# Patient Record
Sex: MALE | Race: WHITE
[De-identification: names, ages, dates, MRNs, and addresses within clinical notes are randomized per-mention and may not be internally consistent; named-entity substitution may affect disease eponyms.]

---

## 2018-02-24 ENCOUNTER — HOSPITAL ENCOUNTER (EMERGENCY)
Dept: HOSPITAL 80 - FED | Age: 23
Discharge: HOME | End: 2018-02-24
Payer: MEDICAID

## 2018-02-24 VITALS — SYSTOLIC BLOOD PRESSURE: 123 MMHG | HEART RATE: 74 BPM | DIASTOLIC BLOOD PRESSURE: 67 MMHG

## 2018-02-24 VITALS — TEMPERATURE: 98.4 F | OXYGEN SATURATION: 95 % | RESPIRATION RATE: 16 BRPM

## 2018-02-24 DIAGNOSIS — S93.402A: Primary | ICD-10-CM
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
## 2018-02-24 PROCEDURE — L4386 NON-PNEUM WALK BOOT PRE CST: HCPCS

## 2018-02-24 PROCEDURE — L4350 ANKLE CONTROL ORTHO PRE OTS: HCPCS

## 2018-02-24 NOTE — EDPHY
H & P


Time Seen by Provider: 02/24/18 12:40


HPI/ROS: 





CHIEF COMPLAINT:  Left foot injury





HISTORY OF PRESENT ILLNESS:  22-year-old male presents to the emergency 

department with left foot injury.  The patient was playing soccer on 2/21/2018 

and thinks that he rolled his left ankle.  He complains of pain to the lateral 

aspect of his left foot and left ankle.  He is able to bear weight however he 

denies any other trauma or injury.  He states that he has had numerous ankle 

sprains from playing soccer.





ROS:  Denies numbness or tingling in his toes, pain in his left knee or hip.


Past Medical/Surgical History: 





Attention deficit hyperactivity disorder, seizure, anxiety


Social History: 





Single and lives in [place].  He works in [employer].


Smoking Status: Never smoked


Physical Exam: 





On examination there is no obvious effusion or swelling noted to the left 

ankle.  He does have reproducible pain with palpation to distal part of the 

lateral malleolus.  No abrasion or puncture wound.  No ecchymosis.  He has full 

dorsi and plantar flexion.  No obvious ligament instability.  Strong dorsalis 

pedis pulse on the dorsal aspect of the left foot.  Normal sensation to light 

touch with normal 2 point discrimination.  His Achilles tendon is intact.  His 

calf is nontender.  His knee is stable.  He is able to bear weight however he 

does have an antalgic gait.


Constitutional: 


 Initial Vital Signs











Temperature (C)  36.9 C   02/24/18 12:20


 


Heart Rate  78   02/24/18 12:20


 


Respiratory Rate  16   02/24/18 12:20


 


Blood Pressure  131/75 H  02/24/18 12:20


 


O2 Sat (%)  95   02/24/18 12:20








 











O2 Delivery Mode               Room Air














Allergies/Adverse Reactions: 


 





No Known Allergies Allergy (Unverified 02/24/18 12:20)


 








Home Medications: 














 Medication  Instructions  Recorded


 


Gabapentin [Neurontin 400 MG (*)]  02/24/18


 


Lisdexamfetamine Dimesylate 60 mg PO DAILY 02/24/18





[Vyvanse]  


 


Topiramate [Topamax] 25 mg PO 02/24/18














MDM/Departure





- MDM


Imaging: I viewed and interpreted images myself


Procedures: 





Velcro ankle stirrup splint applied to the left ankle and examined post 

application in good placement with normal CNS.


ED Course/Re-evaluation: 





22-year-old male presents to the emergency department with pain in his left 

ankle.  He was playing soccer and somehow inverted his left foot and ankle.  

Initially x-rays were ordered by the nurse of the left foot which revealed 

possible bony fragment off the lateral malleolus of the ankle.





X-rays were ordered of the left ankle which revealed bony fragment just distal 

to the lateral malleolus which is likely old.  He was well corticated.  Patient 

states that he has had numerous ankle sprains in the past.  No known fractures 

that he is aware of.  Patient has no obvious joint effusion.  There is really 

no swelling noted on examination to the ankle.  He was placed in Velcro ankle 

stirrup splint and given orthopedic referral.  He may weightbear as tolerated 

or use crutches needed.





- Depart


Disposition: Home, Routine, Self-Care


Clinical Impression: 


Left ankle sprain


Qualifiers:


 Encounter type: initial encounter Involved ligament of ankle: unspecified 

ligament Qualified Code(s): S93.402A - Sprain of unspecified ligament of left 

ankle, initial encounter





Condition: Good


Instructions:  Ankle Sprain (ED)


Additional Instructions: 


Sven dasilva for comfort and support.  Ibuprofen 600 mg every 8 hr as needed 

for pain. 





Weightbear as tolerated or use crutches.


Stand Alone Forms:  Work Excuse


Referrals: 


Salvador Villegas MD [Medical Doctor] - 2-3 days without fail (Orthopedic 

surgeon on-call)

## 2018-05-05 ENCOUNTER — HOSPITAL ENCOUNTER (EMERGENCY)
Dept: HOSPITAL 80 - FED | Age: 23
Discharge: HOME | End: 2018-05-05
Payer: MEDICAID

## 2018-05-05 VITALS — SYSTOLIC BLOOD PRESSURE: 127 MMHG | DIASTOLIC BLOOD PRESSURE: 79 MMHG
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
## 2018-05-05 DIAGNOSIS — S00.83XA: Primary | ICD-10-CM
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
## 2019-03-04 ENCOUNTER — HOSPITAL ENCOUNTER (INPATIENT)
Dept: HOSPITAL 80 - FED | Age: 24
LOS: 5 days | Discharge: PSYCHIATRIC OR SUBSTANCE USE FACILITY | DRG: 917 | End: 2019-03-09
Attending: FAMILY MEDICINE | Admitting: FAMILY MEDICINE
Payer: MEDICAID

## 2019-03-04 DIAGNOSIS — S14.3XXA: ICD-10-CM

## 2019-03-04 DIAGNOSIS — F90.2: ICD-10-CM

## 2019-03-04 DIAGNOSIS — R45.851: ICD-10-CM
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
## 2019-03-04 DIAGNOSIS — T43.621A: ICD-10-CM

## 2019-03-04 DIAGNOSIS — R09.02: ICD-10-CM

## 2019-03-04 DIAGNOSIS — E87.5: ICD-10-CM

## 2019-03-04 DIAGNOSIS — G92: ICD-10-CM

## 2019-03-04 DIAGNOSIS — G40.909: ICD-10-CM

## 2019-03-04 DIAGNOSIS — F43.29: ICD-10-CM

## 2019-03-04 DIAGNOSIS — M62.82: ICD-10-CM

## 2019-03-04 DIAGNOSIS — T40.1X1A: Primary | ICD-10-CM
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
## 2019-03-04 DIAGNOSIS — E87.1: ICD-10-CM

## 2019-03-04 DIAGNOSIS — N17.9: ICD-10-CM

## 2019-03-04 DIAGNOSIS — T40.2X1A: ICD-10-CM
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
## 2019-03-04 DIAGNOSIS — F41.8: ICD-10-CM

## 2019-03-04 DIAGNOSIS — R74.0: ICD-10-CM

## 2019-03-04 DIAGNOSIS — E86.0: ICD-10-CM

## 2019-03-04 DIAGNOSIS — I67.83: ICD-10-CM

## 2019-03-04 DIAGNOSIS — I63.543: ICD-10-CM

## 2019-03-04 LAB
CK SERPL-CCNC: (no result) IU/L (ref 0–224)
INR PPP: 1.23 (ref 0.83–1.16)
PLATELET # BLD: 203 10^3/UL (ref 150–400)
PROTHROMBIN TIME: 15 SEC (ref 12–15)

## 2019-03-04 PROCEDURE — G0480 DRUG TEST DEF 1-7 CLASSES: HCPCS

## 2019-03-04 RX ADMIN — SODIUM CHLORIDE ONE: 900 INJECTION, SOLUTION INTRAVENOUS at 23:52

## 2019-03-04 RX ADMIN — SODIUM CHLORIDE ONE MLS: 900 INJECTION, SOLUTION INTRAVENOUS at 22:00

## 2019-03-04 RX ADMIN — NALOXONE HYDROCHLORIDE ONE MG: 0.4 INJECTION, SOLUTION INTRAMUSCULAR; INTRAVENOUS; SUBCUTANEOUS at 20:53

## 2019-03-04 RX ADMIN — NALOXONE HYDROCHLORIDE ONE: 0.4 INJECTION, SOLUTION INTRAMUSCULAR; INTRAVENOUS; SUBCUTANEOUS at 23:51

## 2019-03-04 RX ADMIN — SODIUM CHLORIDE ONE MLS: 900 INJECTION, SOLUTION INTRAVENOUS at 23:00

## 2019-03-04 NOTE — CPEKG
Test Reason : OPEN

Blood Pressure : ***/*** mmHG

Vent. Rate : 105 BPM     Atrial Rate : 105 BPM

   P-R Int : 135 ms          QRS Dur : 101 ms

    QT Int : 339 ms       P-R-T Axes : 079 -33 048 degrees

   QTc Int : 449 ms

 

Sinus tachycardia

Left axis deviation

 

Confirmed by Courtney Troy ([id number]) on 3/4/2019 11:57:13 PM

 

Referred By: Courtney Troy           Confirmed By:Courtney Troy
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
## 2019-03-05 LAB
CK SERPL-CCNC: (no result) IU/L (ref 0–224)
CK SERPL-CCNC: (no result) IU/L (ref 0–224)
INR PPP: 1.35 (ref 0.83–1.16)
PLATELET # BLD: 142 10^3/UL (ref 150–400)
PROTHROMBIN TIME: 16.1 SEC (ref 12–15)

## 2019-03-05 RX ADMIN — PANTOPRAZOLE SODIUM SCH MG: 40 TABLET, DELAYED RELEASE ORAL at 08:28

## 2019-03-05 RX ADMIN — PANTOPRAZOLE SODIUM SCH MG: 40 TABLET, DELAYED RELEASE ORAL at 22:41

## 2019-03-05 RX ADMIN — SODIUM CHLORIDE SCH MLS: 900 INJECTION, SOLUTION INTRAVENOUS at 22:41

## 2019-03-05 RX ADMIN — ACETAMINOPHEN PRN MG: 325 TABLET ORAL at 12:20

## 2019-03-05 RX ADMIN — ACETAMINOPHEN PRN MG: 325 TABLET ORAL at 19:33

## 2019-03-05 RX ADMIN — GABAPENTIN SCH MG: 300 CAPSULE ORAL at 15:20

## 2019-03-05 RX ADMIN — SODIUM CHLORIDE SCH MLS: 900 INJECTION, SOLUTION INTRAVENOUS at 08:28

## 2019-03-05 RX ADMIN — GABAPENTIN SCH MG: 300 CAPSULE ORAL at 22:41

## 2019-03-05 RX ADMIN — NICOTINE SCH MG: 21 PATCH, EXTENDED RELEASE TOPICAL at 08:27

## 2019-03-05 RX ADMIN — SODIUM CHLORIDE SCH MLS: 900 INJECTION, SOLUTION INTRAVENOUS at 02:09

## 2019-03-05 NOTE — ASMTCMCOM
CM Note

 

CM Note                       

Notes:

Pt is a 24 yo  M who presented wih encepalopathy, acidosis, hypoventilation. Pt reportedly 

overdosed on heroin and xanax and is on M1 hold. Pt's sister completed suicide approximately one 

year ago. 



CM spoke with Leyla from [facility] ([phone]) who requested doc to MD santos notified. [facility] 


to eval once medically stable. 



CM to follow. 



Plan: transfer to inpatient behavioral health once medically stable. 

 

Date Signed:  03/05/2019 02:19 PM

Electronically Signed By:TRACY Garcia

## 2019-03-05 NOTE — PDMN
Medical Necessity


Medical necessity: Pt meets inpt criteria per MD order and MCG M-83, Stroke: 

Ischemic, 2 days. 22 y/o w/hx anxiety/depression, ADHD, seizure disorder, and 

polysubstance abuse presented to ED after being found down/unresponsive for up 

to 11.5 hrs, admitted w/ bilateral cerebellar infarcts- uncertain etiology, 

acute hearing loss, L arm weakness, acute renal failure and potentially ATN in 

setting of severe dehydration and rhabdo w/CK of 30,000+, acute toxic metabolic 

encephalopathy, electrolyte disturbances (hyperkalemia, hyponatremia), 

transaminitis, elevated troponin (likely sec to rhabdo), hypoxia, polysubstance 

abuse w/likely overdose- psych consult when mentation improves, leukocytosis. 

ICU care, renal and neuro consults in AM, stroke protocol, warrants inpt 

admission as pt is acutely ill w/high potential for decompensation, anticipate>

2MN for ongoing eval/management of above.

## 2019-03-05 NOTE — PDCONSULT
Consultant Note: 


Renal Consult Note





- Chief Complaint


Found down





- History of Present Illness


The patient is a 22 y/o M with a knonw h/o ADD, seizure disorder and drug abuse 

who presented to the ED last night after being found unresponsive at home. The 

patient was c/o L arm weakness per EMS. He stated that his sister had recently 

committed suicide and he had been using heroin as well as opiates and 

benzodiazepines. He reportedly also had SI. In the ED, he was found to have an 

PRACHI with hyperkalemia, however specimen was slightly hemolyzed. He was placed 

on fluids in the ICU overnight. This am he states that he is still having 

trouble with his arm, however it feels like it is improving. He denies any 

kidney issues in the past and no recent NSAID use. He isn't sure how long he 

was "down" and does not recall many events in the past 48 hours. His biggest 

complaint is muscle cramping.





- Allergies/Home Medication List


No Known Allergies Allergy


 


Home Medications: 


Gabapentin [Neurontin 400 MG (*)]  


Adderall 10 MG (*)  03/04/19


Ativan  03/04/19 


Klonopin  03/04/19 





- Past Medical History


Anxiety, attention deficit hyperactivity disorder, seizure





- Surgical History


No previous surgeries





- Family History


No renal history in his family that he is aware.





- Social History


Smoking Status: Never smoked


Alcohol Use: Occasionally


Drug Use: Heroin, Marijuana





Review of Systems


10pt was reviewed & negative except for what was stated in HPI & below





Objective:

















Temp Pulse Resp BP Pulse Ox


 


 36.9 C   93   12   128/67 H  100 


 


 03/05/19 05:33  03/05/19 07:00  03/05/19 07:00  03/05/19 07:00  03/05/19 07:00




















O2 (L/minute)                  2











Physical Exam:





General: A+Ox3, no distress


HEENT: EOMI, MMM


Neck: Supple, no thyromegaly


CV: RRR, no murmurs or rubs


Resp: CTA b/l


Abd: Soft, NT, ND


Ext: No edema, pulses intact


Neuro: Only able to move L fingers, other extremities ok, CN II-XII grossly 

intact


Skin: No rashes or lesions


Psych: Calm, cooperative

















WBC  10.90 10^3/uL (3.80-9.50)  H  03/05/19  06:25    


 


RBC  4.27 10^6/uL (4.40-6.38)  L  03/05/19  06:25    


 


Hgb  12.7 g/dL (13.7-17.5)  L  03/05/19  06:25    


 


Hct  36.5 % (40.0-51.0)  L  03/05/19  06:25    


 


MCV  85.5 fL (81.5-99.8)   03/05/19  06:25    


 


MCH  29.7 pg (27.9-34.1)   03/05/19  06:25    


 


MCHC  34.8 g/dL (32.4-36.7)   03/05/19  06:25    


 


RDW  13.1 % (11.5-15.2)   03/05/19  06:25    


 


Plt Count  142 10^3/uL (150-400)  L  03/05/19  06:25    


 


MPV  10.0 fL (8.7-11.7)   03/05/19  06:25    


 


Neut % (Auto)  80.7 % (39.3-74.2)  H  03/05/19  06:25    


 


Lymph % (Auto)  9.4 % (15.0-45.0)  L  03/05/19  06:25    


 


Mono % (Auto)  9.3 % (4.5-13.0)   03/05/19  06:25    


 


Eos % (Auto)  0.1 % (0.6-7.6)  L  03/05/19  06:25    


 


Baso % (Auto)  0.1 % (0.3-1.7)  L  03/05/19  06:25    


 


Nucleat RBC Rel Count  0.0 % (0.0-0.2)   03/05/19  06:25    


 


Absolute Neuts (auto)  8.81 10^3/uL (1.70-6.50)  H  03/05/19  06:25    


 


Absolute Lymphs (auto)  1.02 10^3/uL (1.00-3.00)   03/05/19  06:25    


 


Absolute Monos (auto)  1.01 10^3/uL (0.30-0.80)  H  03/05/19  06:25    


 


Absolute Eos (auto)  0.01 10^3/uL (0.03-0.40)  L  03/05/19  06:25    


 


Absolute Basos (auto)  0.01 10^3/uL (0.02-0.10)  L  03/05/19  06:25    


 


Absolute Nucleated RBC  0.00 10^3/uL (0-0.01)   03/05/19  06:25    


 


Immature Gran %  0.4 % (0.0-1.1)   03/05/19  06:25    


 


Immature Gran #  0.04 10^3/uL (0.00-0.10)   03/05/19  06:25    


 


PT  16.1 SEC (12.0-15.0)  H  03/05/19  06:25    


 


INR  1.35  (0.83-1.16)  H  03/05/19  06:25    


 


APTT  34.0 SEC (23.0-38.0)   03/05/19  06:25    


 


VBG Lactic Acid  1.3 mmol/L (0.7-2.1)   03/04/19  23:10    


 


Sodium  134 mEq/L (135-145)  L  03/05/19  06:25    


 


Potassium  4.6 mEq/L (3.5-5.2)   03/05/19  06:25    


 


Chloride  102 mEq/L ()   03/05/19  06:25    


 


Carbon Dioxide  24 mEq/l (22-31)   03/05/19  06:25    


 


Anion Gap  8 mEq/L (6-14)   03/05/19  06:25    


 


BUN  37 mg/dL (7-23)  H  03/05/19  06:25    


 


Creatinine  1.7 mg/dL (0.7-1.3)  H  03/05/19  06:25    


 


Estimated GFR  50   03/05/19  06:25    


 


Glucose  105 mg/dL ()  H  03/05/19  06:25    


 


Calcium  7.9 mg/dL (8.5-10.4)  L  03/05/19  06:25    


 


Ionized Calcium  1.03 MMOL/L (1.12-1.30)  L  03/05/19  03:30    


 


Phosphorus  3.8 mg/dL (2.5-4.5)   03/05/19  06:25    


 


Magnesium  2.2 mg/dL (1.6-2.3)   03/05/19  03:30    


 


Total Bilirubin  0.4 mg/dL (0.1-1.4)   03/05/19  06:25    


 


Conjugated Bilirubin  0.5 mg/dL (0.0-0.5)   03/04/19  21:00    


 


Unconjugated Bilirubin  0.0 mg/dL (0.0-1.1)   03/04/19  21:00    


 


AST  2968 IU/L (17-59)  H  03/05/19  06:25    


 


ALT  1376 IU/L (21-72)  H  03/05/19  06:25    


 


Alkaline Phosphatase  85 IU/L ()   03/05/19  06:25    


 


Creatine Kinase  > 94978 IU/L (0-224)  H  03/05/19  06:25    


 


CK-MB (CK-2) Fraction  1410.00 ng/mL (0.00-4.55)  H  03/05/19  06:25    


 


CK-MB (CK-2) %  TNP   03/05/19  06:25    


 


Creatine Kinase Interp  TNP   03/05/19  06:25    


 


POC Troponin I  0.27 ng/mL (0.00-0.08)  H  03/04/19  23:38    


 


Total Protein  6.3 g/dL (6.3-8.2)   03/05/19  06:25    


 


Albumin  3.6 g/dL (3.5-5.0)   03/05/19  06:25    


 


Urine Color  HASMUKH   03/04/19  23:24    


 


Urine Appearance  HAZY   03/04/19  23:24    


 


Urine pH  6.0  (5.0-7.5)   03/04/19  23:24    


 


Ur Specific Gravity  1.014  (1.002-1.030)   03/04/19  23:24    


 


Urine Protein  2+  (NEGATIVE)  H  03/04/19  23:24    


 


Urine Ketones  NEGATIVE  (NEGATIVE)   03/04/19  23:24    


 


Urine Blood  3+  (NEGATIVE)  H  03/04/19  23:24    


 


Urine Nitrate  NEGATIVE  (NEGATIVE)   03/04/19  23:24    


 


Urine Bilirubin  NEGATIVE  (NEGATIVE)   03/04/19  23:24    


 


Urine Urobilinogen  NEGATIVE EU (0.2-1.0)   03/04/19  23:24    


 


Ur Leukocyte Esterase  NEGATIVE  (NEGATIVE)   03/04/19  23:24    


 


Urine RBC  1-3 /hpf (0-3)   03/04/19  23:24    


 


Urine WBC  5-10 /hpf (0-3)  H  03/04/19  23:24    


 


Ur Epithelial Cells  TRACE /lpf (NONE-1+)   03/04/19  23:24    


 


Urine Mucus  TRACE /lpf (NONE-1+)   03/04/19  23:24    


 


Ur Random Creatinine  109.7 mg/dL  03/04/19  00:11    


 


Ur Random Sodium  10 mEq/L (30-90)  L  03/04/19  00:11    


 


Urine Glucose  NEGATIVE  (NEGATIVE)   03/04/19  23:24    


 


Salicylates  < 1.0 mg/dL (2.0-20.0)  L  03/04/19  21:00    


 


Urine Opiates Screen  NON-NEGATIVE  (NEGATIVE)  H  03/04/19  23:24    


 


Acetaminophen  < 10 mcg/mL (10-30)  L  03/04/19  21:00    


 


Urine Barbiturates  NEGATIVE  (NEGATIVE)   03/04/19  23:24    


 


Ur Phencyclidine Scrn  NEGATIVE  (NEGATIVE)   03/04/19  23:24    


 


Ur Amphetamine Screen  NON-NEGATIVE  (NEGATIVE)  H  03/04/19  23:24    


 


U Benzodiazepines Scrn  NEGATIVE  (NEGATIVE)   03/04/19  23:24    


 


Urine Cocaine Screen  NEGATIVE  (NEGATIVE)   03/04/19  23:24    


 


U Marijuana (THC) Screen  NON-NEGATIVE  (NEGATIVE)  H  03/04/19  23:24    


 


Ethyl Alcohol  < 10 mg/dL (0-10)   03/04/19  21:00    








Imaging: CT results reviewed.





Assessment/Plan: The patient is a 22 y/o M with a known h/o drug abuse who 

presented after being found down after using heroine and other pharmaceutical 

drugs with PRACHI, hyperkalemia, hyponatremia, and L arm weakness. MRI reveals 

multiple infarcts in the cerebellum. 





PRACHI


-ordered renal US


-Philly low and improving with fluids


-baseline cr unknown, 2.1 on presentation now trending down


-UA shows blood and protein, likely 2/2 to rhabdomyolysis with CK>30k


-continue to monitor UO (interiano removed)


-keep MAP>65, avoid nephrotoxins





Hyperkalemia


-repeat K was 5.7, down to 4.6 this am w/o treatment


-renal diet for now


-monitor on telemetry





Hyponatremia


-likely hypoosmolar hypovolemic per urine studies


-presented at 128mg/dL, now up to 134


-goal for today 134-136mg/dL


-neuro recommending higher serum Na due to MRI findings


-would hold 3% and will lower fluids for now given risk of ODS





SI with heroine abuse and CVA


-tox screen reviewed


-no AGMA, low concern for other toxic ingestions


-M1 hold with bedside sitter


-neurology rec's appreciated 





Will continue to follow, please contact if ?'s. #[phone]





Marquis Klein, DO


[facility]

## 2019-03-05 NOTE — NEUROPROG
Assessment: 


[id number]


  -


Neurology Consult:


-


CC: Dr. Haley consulted neurology for abnormal head CT.  Results placed in EMR 

for her review.


-


HPI: 


3/5/19: Pt with PMHx of seizure disorder, anxiety, and polysubstance abuse.  Pt 

found down for 11.5 hours and dehydrated on 3/4/19.  He reported he had been 

using heroin, percocet, and xanax recently.  Head CT suggesting of abnormality 

in posterior circulation region.  Brain MRI showed bilateral cerebellar 

infarcts.  Teleneurology recommended full dose aspirin.  He was in renal 

failure so could not get a head/neck CTA.  Pt also complained of left arm 

weakness (possibly from lying on his arm for prolonged period of time).  Pt in 

renal failure with rhabdomyolysis likely from prolonged dehydration.  I 

initially saw the patient on 3/5/19.  Neurologic exam showed left arm weakness.

  I felt he likely had a PRES from acute renal failure that resulted in 

bilateral cerebellar strokes as well as left arm neuropathy from prolonged 

laying on this arm.  Recommend carotid U/S, TTE, telemetry, and PT/OT/SPeech as 

well as addressing renal issues.


-


PMHx: anxiety, ADD, seizure disorder, polysubstance abuse


-


Home Meds: gabapentin, adderall, ativan, klonopin


-


SHx: no tobacco


FHx: sister committed suicide


-


ROS: Pt denied acute fever, total vision loss, active severe chest pain, 

respiratory failure, total body severe rash, total bowel/bladder incontinence, 

psychosis, active seizures, or active bleeding


-


O:


VS reviewed


General: Alert


Eyes: Fundoscopic exam not able to visualize optic disks


CV: Heart RRR, no murmur, no carotid bruit


Lungs: Clear to auscultation bilaterally, no rhonchi or rales


Neuro:


- Mental: 


.     Oriented x person/place/date


.     concentration appears normal


.     speech fluency/comprehension normal


.     memory appears normal


.     fund of knowledge appear intact


- Cranial Nerves:


.     II: PERRL, VFFTC


.     III/IV/VI: EOMI, no nystagmus, normal smooth pursuits, no Ptosis


.     V: facial sensation intact to LT


.     VII: face symmetric to eye closure and smile


.     VIII: hearing intact to conversation


.     IX/X: uvula raises symmetrically


.     XI: SCM 5/5 B/L strength


.     XII: tongue protrudes midline w/nl strength


- Motor: 


.     Tone: normal tone in all 4 extremity


.     Strength: left arm weakness


- Reflexes: B/L patella 2/4


- Sensory: all 4 extremity intact to light touch


- Coord: no coordination in right arm


- Gait: deferred


-


Labs:


3/4/19- CBC WBC 15.18H, INR 1.23, CK > 30,000


3/5/19- Chem Na 131L BUN 39H Cr 1.7H Gluc 110H Ca 7.7L Ca 1.03L


-


Rads:


3/4/19- Head CT wo: Diffuse low-density and mild swelling of both cerebellar 

hemispheres. While this could represent artifact, posterior circulation 

ischemia or PRES syndrome could appear similar. Recommend MRI of the brain for 

further evaluation (I personally visualized the images on 3/5/19)


-


3/4/19- Cervical MRI wo: Negative MRI examination of the cervical spine


3/4/19- Brain MRI wo con: Multiple bilateral punctate areas of ischemic injury 

involving the cerebellum. No additional areas of ischemia within the cerebral 

hemispheres bilaterally


-


Assessment:


1. 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 

renal failure may have caused PRES.


-


2. Substance Abuse


3. Acute renal failure with rhabdomyolysis


4. Left arm weakness: Possible from cerebellar stroke or prolonged laying on 

the arm.  PT/OT to assess rehab needs.


-


Plan:


- Recommend ICU stay until 3/7/19 to ensure no swelling in posterior fossa 

causing hydrocephalus given location of cerebellar strokes


- Carotid U/S


- TTE


- Telemetry for any afib


- Blood pressure goal < 140/90 from neurology perspective unless nephrology 

feels different goals needed, no permissive HTN as it seems like elevated blood 

pressure from PRES could be cause of strokes


- LDL goal < 70, check lipid panel


- H1AC goal < 7.0, check H1AC


- Continue aspirin 325 mg qd for stroke prevention (not on it prior to 

hospitalization)


- Avoid illegal drugs


- PT/OT/Speech to determine rehab needs


- F/U in neurology clinic 1-4 weeks after hospital discharge





Objective: 





 Vital Signs











Temp Pulse Resp BP Pulse Ox


 


 36.8 C   105 H  20   123/77 H  98 


 


 03/05/19 08:00  03/05/19 08:00  03/05/19 08:00  03/05/19 08:00  03/05/19 08:00








 Laboratory Results





 03/05/19 06:25 





 03/05/19 06:25 





 











 03/04/19 03/05/19 03/06/19





 05:59 05:59 05:59


 


Intake Total  3080 


 


Output Total  1800 


 


Balance  1280 








 











PT  16.1 SEC (12.0-15.0)  H  03/05/19  06:25    


 


INR  1.35  (0.83-1.16)  H  03/05/19  06:25    











Allergies/Adverse Reactions: 


 





No Known Allergies Allergy (Verified 03/05/19 08:08)

## 2019-03-05 NOTE — PDGENHP
History and Physical





- Chief Complaint


Left arm weakness, found down





- History of Present Illness








Source-currently patient is slightly encephalopathic but awake and alert.  He 

has poor memory however as I have to repeat multiple times during the same 

interview the findings I discussed with the patient including renal failure, 

cause for his Interiano catheter, cerebellar infarcts, etc.  He is overall a poor 

historian at this time in his history is inconsistent with that provided to 

various providers and nursing staff.  EMR was reviewed and case discussed with 

ED provider.





HPI-this is a 23-year-old gentleman with past medical history significant for 

anxiety, attention deficit hyperactivity disorder, seizure disorder, 

polysubstance abuse who presents emergency department today after his friends 

found him unresponsive on his left side at home.  EMS was called and patient 

transported to the emergency department.  On route patient became more 

interactive complained that he was not able to use his left arm.  He was also 

complaining of decreased hearing.  Patient currently complaining of severe pain 

at the site of his catheter in his bladder and penis.  He also states that he 

feels very dehydrated.  He denies any fevers or chills.  No chest pain, 

palpitations, shortness of breath.  Patient does note a little bit of nausea 

without any vomiting or diarrhea.  I asked the patient if he had any suicidal 

thoughts and he reports that he does not want to kill himself at this time.  He 

states that his sister just recently  by suicide and he has been self 

medicating with heroin, Percocet and Xanax.  Her ED provider patient had been 

texting his friends and family that he had suicidal ideations.  He told the ED 

provider that he was trying to treat his chronic left shoulder pain.  Patient 

has been but put on a detained her per initial ED provider's notation. Per RN 

however patient did note that he wanted to die but did not endorse any active 

thoughts of harming self. At time of my interview patient adamantly denies that 

he has active suicidal thoughts and just wanted to try to treat his depression 

and grief.  





History Information





- Allergies/Home Medication List


Allergies/Adverse Reactions: 








No Known Allergies Allergy (Unverified 18 08:56)


 





Home Medications: 








Gabapentin [Neurontin 400 MG (*)]  18 [Last Taken Unknown]


Adderall 10 MG (*)  19 [Last Taken Unknown]


Ativan  19 [Last Taken Unknown]


Klonopin  19 [Last Taken Unknown]





I have personally reviewed and updated: family history, medical history, social 

history, surgical history





- Past Medical History


Additional medical history: Anxiety, attention deficit hyperactivity disorder, 

seizure





- Surgical History


Additional surgical history: Unable to obtain despite attempting to redirect 

the patient as he has increasing agitation and anxiety during the interview.





- Family History


Additional family history: Unable to obtain patient increasingly agitated that 

he can't answer questions.





- Social History


Smoking Status: Never smoked


Alcohol Use: Occasionally


Drug Use: Heroin, Marijuana





Review of Systems


Review of Systems: 





ROS: 10pt was reviewed & negative except for what was stated in HPI & below


Constitutional: Reports: weakness (Left arm).  Denies: chills, fever


EENMT: Reports: no symptoms


Cardiac: Reports: no symptoms


Respiratory: Reports: no symptoms


Gastrointestinal: Reports: nausea.  Denies: vomitting, abdominal pain, diarrhea


Genitourinary: Reports: other (Bladder pain, penile pain related to Interiano 

catheter.).  Denies: frequency, hematuria


Muscolosketal: Reports: other (Left shoulder pain)


Skin: Reports: no symptoms


Neurological: Reports: anxiety, depressed, emotional problems, tingling (Left 

arm), weakness (Left arm).  Denies: numbness


Hematologic/Lymphatic: Reports: no symptoms





Physical Exam


Physical Exam: 








 Selected Entries











  19





  20:39


 


Blood Pressure Automatic





Method 


 


Heart Rate 118 H


 


Respiratory 16





Rate 


 


O2 Sat (%) 95


 


Temperature (C) 36.6 C


 


Blood Pressure 110/85 H


 


Mean Arterial 93





Pressure (MAP) 


 


O2 Delivery Room Air





Mode 


 


Temperature Oral





Source 




















Temp Pulse Resp BP Pulse Ox


 


 37.1 C   100   13   126/68 H  99 


 


 19 00:20  19 00:20  19 00:20  19 00:20  19 00:20




















O2 (L/minute)                  2














Constitutional: no apparent distress, uncomfortable, other (NAD upon entering 

the patient's room.  He becomes increasingly agitated and anxious during the 

interview.)


Eyes: PERRL (Decreased reactivity to light bilaterally but symmetric.), 

anicteric sclera, EOMI, No scleral injection


Ears, Nose, Mouth, Throat: poor dentition, dry mucous membranes, other (No 

nasal discharge.  Minimal oropharyngeal erythema but no exudate.)


Cardiovascular: no murmur, rub, or gallop, pulses symmetric bilaterally, 

tachycardia, No edema


Peripheral Pulses: 2+: dorsalis-pedis (R), dorsalis-pedis (L)


Respiratory: no respiratory distress, no rales or rhonchi, clear to auscultation

, reduced air movement (Diminished bibasilarly.), No respiratory distress


Gastrointestinal: normoactive bowel sounds, soft, non-tender abdomen, no 

palpable masses, No distension


Genitourinary: interiano in urethra, other (Dark silvino color urine.), No no bladder 

tenderness


Skin: warm, normal color, no rashes or abrasions


Musculoskeletal: full muscle strength (Patient able to sit up independently.  

Moves all extremities except for left upper extremity.), no muscle tenderness, 

No pain with ROM, No generalized weakness


Neurologic: AAOx3, weakness (Left arm flaccidity.), No facial droop


Psychiatric: thought process linear, encephalopathic, anxious, depressed, 

agitated, poor insight, poor judgement, poor memory, No interacting 

appropriately, No suicidal ideation (See HPI.  However at time of my interview 

patient adamantly denied any suicidal ideation or homicidal ideation.)





Lab Data & Imaging Review





 19 21:00





 19 23:10














WBC  15.18 10^3/uL (3.80-9.50)  H  19  21:00    


 


RBC  5.11 10^6/uL (4.40-6.38)   19  21:00    


 


Hgb  15.0 g/dL (13.7-17.5)   19  21:00    


 


Hct  43.4 % (40.0-51.0)   19  21:00    


 


MCV  84.9 fL (81.5-99.8)   19  21:00    


 


MCH  29.4 pg (27.9-34.1)   19  21:00    


 


MCHC  34.6 g/dL (32.4-36.7)   19  21:00    


 


RDW  12.8 % (11.5-15.2)   19  21:00    


 


Plt Count  203 10^3/uL (150-400)   19  21:00    


 


MPV  9.8 fL (8.7-11.7)   19  21:00    


 


Neut % (Auto)  85.4 % (39.3-74.2)  H  19  21:00    


 


Lymph % (Auto)  7.0 % (15.0-45.0)  L  19  21:00    


 


Mono % (Auto)  7.0 % (4.5-13.0)   19  21:00    


 


Eos % (Auto)  0.0 % (0.6-7.6)  L  19  21:00    


 


Baso % (Auto)  0.1 % (0.3-1.7)  L  19  21:00    


 


Nucleat RBC Rel Count  0.0 % (0.0-0.2)   19  21:00    


 


Absolute Neuts (auto)  12.97 10^3/uL (1.70-6.50)  H  19  21:00    


 


Absolute Lymphs (auto)  1.06 10^3/uL (1.00-3.00)   19  21:00    


 


Absolute Monos (auto)  1.06 10^3/uL (0.30-0.80)  H  19  21:00    


 


Absolute Eos (auto)  0.00 10^3/uL (0.03-0.40)  L  19  21:00    


 


Absolute Basos (auto)  0.01 10^3/uL (0.02-0.10)  L  19  21:00    


 


Absolute Nucleated RBC  0.00 10^3/uL (0-0.01)   19  21:00    


 


Immature Gran %  0.5 % (0.0-1.1)   19  21:00    


 


Immature Gran #  0.08 10^3/uL (0.00-0.10)   19  21:00    


 


PT  15.0 SEC (12.0-15.0)   19  21:00    


 


INR  1.23  (0.83-1.16)  H  19  21:00    


 


APTT  34.9 SEC (23.0-38.0)   19  21:00    


 


VBG Lactic Acid  1.3 mmol/L (0.7-2.1)   19  23:10    


 


Sodium  129 mEq/L (135-145)  L  19  23:10    


 


Potassium  5.7 mEq/L (3.5-5.2)  H  19  23:10    


 


Chloride  98 mEq/L ()   19  23:10    


 


Carbon Dioxide  22 mEq/l (22-31)   19  23:10    


 


Anion Gap  9 mEq/L (6-14)   19  23:10    


 


BUN  38 mg/dL (7-23)  H  19  23:10    


 


Creatinine  1.9 mg/dL (0.7-1.3)  H  19  23:10    


 


Estimated GFR  44   19  23:10    


 


Glucose  87 mg/dL ()   19  23:10    


 


Calcium  7.1 mg/dL (8.5-10.4)  L  19  23:10    


 


Phosphorus  4.2 mg/dL (2.5-4.5)   19  23:10    


 


Total Bilirubin  0.5 mg/dL (0.1-1.4)   19  21:00    


 


Conjugated Bilirubin  0.5 mg/dL (0.0-0.5)   19  21:00    


 


Unconjugated Bilirubin  0.0 mg/dL (0.0-1.1)   19  21:00    


 


AST  1813 IU/L (17-59)  H  19  21:00    


 


ALT  1308 IU/L (21-72)  H  19  21:00    


 


Alkaline Phosphatase  121 IU/L ()   19  21:00    


 


Creatine Kinase  > 22094 IU/L (0-224)  H  19  21:00    


 


CK-MB (CK-2) Fraction  716.00 ng/mL (0.00-4.55)  H  19  21:00    


 


CK-MB (CK-2) %  > 2.4 % (0.0-4.0)   19  21:00    


 


Creatine Kinase Interp  NEGATIVE  (NEGATIVE)   19  21:00    


 


POC Troponin I  0.27 ng/mL (0.00-0.08)  H  19  23:38    


 


Total Protein  8.4 g/dL (6.3-8.2)  H  19  21:00    


 


Albumin  5.2 g/dL (3.5-5.0)  H  19  21:00    


 


Urine Color  SILVINO   19  23:24    


 


Urine Appearance  HAZY   19  23:24    


 


Urine pH  6.0  (5.0-7.5)   19  23:24    


 


Ur Specific Gravity  1.014  (1.002-1.030)   19  23:24    


 


Urine Protein  2+  (NEGATIVE)  H  19  23:24    


 


Urine Ketones  NEGATIVE  (NEGATIVE)   19  23:24    


 


Urine Blood  3+  (NEGATIVE)  H  19  23:24    


 


Urine Nitrate  NEGATIVE  (NEGATIVE)   19  23:24    


 


Urine Bilirubin  NEGATIVE  (NEGATIVE)   19  23:24    


 


Urine Urobilinogen  NEGATIVE EU (0.2-1.0)   19  23:24    


 


Ur Leukocyte Esterase  NEGATIVE  (NEGATIVE)   19  23:24    


 


Urine RBC  1-3 /hpf (0-3)   19  23:24    


 


Urine WBC  5-10 /hpf (0-3)  H  19  23:24    


 


Ur Epithelial Cells  TRACE /lpf (NONE-1+)   19  23:24    


 


Urine Mucus  TRACE /lpf (NONE-1+)   19  23:24    


 


Urine Glucose  NEGATIVE  (NEGATIVE)   19  23:24    


 


Salicylates  < 1.0 mg/dL (2.0-20.0)  L  19  21:00    


 


Urine Opiates Screen  NON-NEGATIVE  (NEGATIVE)  H  19  23:24    


 


Acetaminophen  < 10 mcg/mL (10-30)  L  19  21:00    


 


Urine Barbiturates  NEGATIVE  (NEGATIVE)   19  23:24    


 


Ur Phencyclidine Scrn  NEGATIVE  (NEGATIVE)   19  23:24    


 


Ur Amphetamine Screen  NON-NEGATIVE  (NEGATIVE)  H  19  23:24    


 


U Benzodiazepines Scrn  NEGATIVE  (NEGATIVE)   19  23:24    


 


Urine Cocaine Screen  NEGATIVE  (NEGATIVE)   19  23:24    


 


U Marijuana (THC) Screen  NON-NEGATIVE  (NEGATIVE)  H  19  23:24    


 


Ethyl Alcohol  < 10 mg/dL (0-10)   19  21:00    








Imaging Review: 





 


Left humerus, 4 views. 


 


 History: Arm pain and numbness. 


 


 Findings: The left humerus is normal appearance. No fracture or periostitis. 

Left shoulder and 


elbow appear unremarkable as visualized. 


 


 


Impression: 


1. Negative left humerus radiographs. 


 


               Dictated By: Dash Ruff MD 





________________________________





Portable AP chest. 3/4/2019 at 2048 


 


 History: dyspnea 


 


 Comparison study: None available 


 


 Findings: Lungs are clear. Heart size is normal. Mild elevation left 

hemidiaphragm. 


 


 No focal infiltrate, pleural effusion, pneumothorax. 


 


 


Impression: Mild elevation left hemidiaphragm; otherwise negative chest. 


 


               Dictated By: Dash Ruff MD 





________________________________





Noncontrast Head CT 


 


 Indication:  Possible CVA. Loss of hearing.. 


 


 Technique:    Standard noncontrast axial CT images of the head were performed. 

Dose reduction 


techniques were utilized. 


 


 Findings:  There is low density centrally and diffuse swelling of the 

cerebellar hemispheres 


bilaterally. This finding suggests the possibility of cerebellar edema which 

may be secondary to 


posterior reversible encephalopathy syndrome or ischemia. For further 

characterization, recommend 


MRI examination of the brain. 


 


 No intracranial hemorrhage, midline shift, impending herniation identified. 


 


 Reconstructed bone windows are negative for calvarial fracture or paranasal 

sinus disease. 


 


 


Impression:  Diffuse low-density and mild swelling of both cerebellar 

hemispheres. While this could 


represent artifact, posterior circulation ischemia or PRES syndrome could 

appear similar. Recommend 


MRI of the brain for further evaluation . 


 


Results called to Dr. Courtney Troy at 9:30 PM at the time of the 

interpretation. 


 


               Dictated By: Dash Ruff MD 


 _______________________________





MRI of the Cervical Spine (Without Contrast) 


 


 History:  Unable to move arm. Found down. 


 


 Technique: Sagittal T1 and T2 sequences. Axial T2 and gradient echo sequences. 


 


 Findings: 


 


 Cervical spinal cord maintains normal signal intensity. The neural foramina 

are widely patent. No 


evidence of epidural hematoma or mass. 


 


 Degenerative changes as follows: 


 


 C2-C3: Negative. 


 


 C3-C4: Negative. 


 


 C4-C5: Negative. 


 


 C5-C6: Negative. 


 


 C6-C7: Negative. 


 


 C7-T1: Negative. 


 


 The craniocervical junction appears normal. 


 


 


Impression: 


1. Negative MRI examination of the cervical spine. 


 


Examination reviewed with Dr. Troy at 11:00 PM. 


 


 


               Dictated By: Dash Ruff MD 





____________________________





MRI of the brain-preliminary report was discussed with ED provider spoke with 

Radiology.  Multiple cerebellar infarcts noted.


Visualized and Interpreted Chest x-ray results: Yes


Visualized and Interpreted imaging results: Yes


EKG additional interpertation: Sinus tach 100s.  LAD.  . No acute ST 

changes.





Assessment & Plan


Assessment: 








23-year-old male with history of anxiety, depression, attention deficit 

hyperactivity disorder, seizure disorder and history of polysubstance abuse who 

presents emergency department today after he was found down unresponsive for up 

to 11.5 hr.








# Bilateral cerebellar infarcts - etiology at this time is not yet clear.  He 

is not a candidate right at the moment due to renal failure for a CTA of the 

neck to evaluate for vertebral artery dissection.  Neurology recommended full-

dose aspirin which was given in lieu of full-dose anticoagulation which could 

increase risk of hemorrhage.  They also recommended correction of sodium as 

appropriate with goal of upper limits of normal.  Stroke protocol.  Dysphagia 

screen.  PT OT.  Additionally echocardiogram has been ordered for the morning.  

Exam is limited and unable to appreciate a cardiac murmur however patient with 

increasing anxiety and agitation during the interview limiting a thorough 

cardiac evaluation.  Will obtain blood cultures.  Patient's white count upon 

arrival was 15,000 which is likely multifactorial.





#Left arm weakness suspected Brachial plexus neuralgia given patient had been 

found down for an unknown period of time but assume for several hours given his 

increased CK.  (Acute), hearing loss - likely related to cerebellar infarct.





# Acute renal failure (Acute) - pre renal and potentially ATN in setting of 

severe dehydration and rhabdomyolysis with greater than measurable CK (30,000+)

.  Patient received 2 L of IV fluid in the emergency department.  Repeat sodium 

showed an increase from 128-129.  Case was discussed with Nephrology upon 

repeat BMP studies.  They still recommend obtaining urine sodium in serial BMPs 

and electrolytes q.3 hours with continued IV fluid NS at a rate of 125 mL/hour.

  Reviewed neural EGD recommendations with Dr. Klein.  We both agree that 

rapid sodium correction would be more harmful for the patient then beneficial 

at this time.  She recommends no greater correction than 135 within initial 24 

hr.  She will plan to see the patient later this morning





# rhabdomyolysis - continue with IV fluids as noted above.  Patient status post 

2 L with continued IV fluid replacement.  Daily CK.





# acute the toxic metabolic encephalopathy - secondary to polysubstance abuse 

and overdose with likely contributing as well hypotension and hypoxia.  Patient 

remains easily agitated and currently has very poor memory.  He reports use of 

Xanax, Percocet, heroin.





# electrolyte disturbances including ,  -  


      - hyperkalemia - potassium trending down with IV fluids.  Less than 6.0.  

No need for additional treatments at this time.  Will continue with fluids and 

monitor.


    - hyponatremia - slow correction with goal goal of 135 in the 1st 24 hr.  

Continue with NS at 125 mL/hour as per Nephrology.


      - hypocalcemia - check ionized calcium.  Replacement if needed.





# transaminitis - likely component of shock liver given patient was reported to 

have been hypoxic and hypotensive.  IV fluids and monitoring of LFTs daily.





# elevated troponin - likely secondary to rhabdo.  Patient without any chest 

pain or acute EKG changes.





# hypoxia - supplemental oxygen p.r.n.





# polysubstance abuse with likely overdose - per my discussion wit the patient 

he denies any intent to harm himself rather he was self treating his depression 

anxiety and grief.  Patient noted to be put on detained her per ED provider 

note.  Additionally patient currently encephalopathic and not able to make a 

full assessment of current condition.  Psych liaison consultation once patient'

s mentation improves.





# leukocytosis - suspect this is mostly reactive however patient does have 

history of IV drug use.  Blood cultures as noted above.  He is currently 

afebrile.  Lower suspicion for Sir sepsis.  Continue with IV fluids as noted 

above.  No antibiotics at this time.





# anxiety depression and grief - will try to avoid sedatives as possible for 

now until patient's mentation improves.





# attention deficit hyperactivity disorder - holding Adderall.





# seizure disorder - continue patient's gabapentin.





FEN - s/p 2 liters NS. continue at 125 mls/ hr. electrolyte treatment plan as 

noted above. diet as tolerated if safe to swallow.


PPX - SCDs. no anticoagulation as per neurology recs in setting of acute 

cerebellar infarcts. 


COR - FULL. 


Dispo - Patient admitted to inpatient status on ICU.  He is acutely ill with 

high potential for decompensation.  Anticipate greater than 2 midnight stay.





Critical care time 45 min.

## 2019-03-05 NOTE — HOSPPROG
Hospitalist Progress Note


Assessment/Plan: 





24 yo M w rhabdo, LUE weakness, cerebellar infarcts, encephalopathy





?vertebral artery dissection: this scenario is most compatible w vertebral 

artery dissection


   ultrasound demonstrates flow


   ultimately needs CTA if renal function improves vs MRA if it doesnt.. CTA 

better study


   for now, continue full dose aspirin





rhabdomyolysis: 2/2 being found down


   continue IVF


   renal injury noted


   follow daily





hyponatremia: repeat now


   will increase IVF rate if stable (currently at 75)


   unknown baseline


   





renal: unknown baseline cr


   he mentions h/o "kidney problems"


   renal u/s findings noted


   





polysubstance abuse: on m1 hold





hyperkalemia: rhabdo and PRACHI





cerebellar cva: see #1


   full doses asa





dispo: mi, icu





   35 min crit care


Subjective: case d/w nakia martinez.  able to flex biceps against gravity


Objective: 


 Vital Signs











Temp Pulse Resp BP Pulse Ox


 


 36.9 C   88   18   127/87 H  98 


 


 03/05/19 12:00  03/05/19 14:00  03/05/19 14:00  03/05/19 14:00  03/05/19 14:00








 Laboratory Results





 03/05/19 06:25 





 03/05/19 06:25 





 











 03/04/19 03/05/19 03/06/19





 05:59 05:59 05:59


 


Intake Total  3080 


 


Output Total  1800 1100


 


Balance  1280 -1100








 











PT  16.1 SEC (12.0-15.0)  H  03/05/19  06:25    


 


INR  1.35  (0.83-1.16)  H  03/05/19  06:25    














- Physical Exam


Constitutional: no apparent distress, appears nourished


Eyes: PERRL, anicteric sclera


Ears, Nose, Mouth, Throat: moist mucous membranes, hearing normal


Cardiovascular: regular rate and rhythym, no murmur, rub, or gallop


Respiratory: no respiratory distress, no rales or rhonchi


Gastrointestinal: normoactive bowel sounds, soft, non-tender abdomen


Genitourinary: no bladder fullness, No interiano in urethra


Skin: warm, normal color


Musculoskeletal: other (LUE weakness), No full muscle strength


Neurologic: No AAOx3


Psychiatric: interacting appropriately


Lymph, Heme, Immunologic: no cervical LAD





ICD10 Worksheet


Patient Problems: 


 Problems











Problem Status Onset


 


Acute renal failure Acute  


 


Brachial plexus neuralgia Acute  


 


Cerebellar infarction Acute  


 


Rhabdomyolysis Acute  


 


Shock liver Acute  


 


Left ankle sprain Acute

## 2019-03-06 LAB — CK SERPL-CCNC: (no result) IU/L (ref 0–224)

## 2019-03-06 RX ADMIN — OXYCODONE HYDROCHLORIDE AND ACETAMINOPHEN PRN TAB: 5; 325 TABLET ORAL at 15:18

## 2019-03-06 RX ADMIN — ASPIRIN SCH MG: 325 TABLET, FILM COATED ORAL at 07:35

## 2019-03-06 RX ADMIN — OXYCODONE HYDROCHLORIDE AND ACETAMINOPHEN PRN TAB: 5; 325 TABLET ORAL at 10:59

## 2019-03-06 RX ADMIN — GABAPENTIN SCH MG: 300 CAPSULE ORAL at 15:55

## 2019-03-06 RX ADMIN — SODIUM CHLORIDE SCH MLS: 900 INJECTION, SOLUTION INTRAVENOUS at 11:39

## 2019-03-06 RX ADMIN — GABAPENTIN SCH MG: 300 CAPSULE ORAL at 22:16

## 2019-03-06 RX ADMIN — PANTOPRAZOLE SODIUM SCH MG: 40 TABLET, DELAYED RELEASE ORAL at 22:16

## 2019-03-06 RX ADMIN — PANTOPRAZOLE SODIUM SCH MG: 40 TABLET, DELAYED RELEASE ORAL at 07:35

## 2019-03-06 RX ADMIN — OXYCODONE HYDROCHLORIDE AND ACETAMINOPHEN PRN TAB: 5; 325 TABLET ORAL at 19:30

## 2019-03-06 RX ADMIN — GABAPENTIN SCH MG: 300 CAPSULE ORAL at 07:34

## 2019-03-06 RX ADMIN — NICOTINE SCH MG: 21 PATCH, EXTENDED RELEASE TOPICAL at 07:35

## 2019-03-06 RX ADMIN — ACETAMINOPHEN PRN MG: 325 TABLET ORAL at 05:23

## 2019-03-06 NOTE — PDCONSULT
Consultant Note: 


Les is a 22 y/o male admitted yesterday after being found down by EMR.  He 

had apparently been self medicating with Xanax and Heroin for left shoulder 

pain from a prior "shoulder fracture" and must have lost  consciousness some 

time thereafter.  It is not clear how long he was down.  He was admitted to the 

ICU and surgical consultation was requested today to evaluate his left upper 

arm for possible compartment syndrome.  He reports pain the left shoulder and 

upper arm with weakness in the LUE and hand.





PMH: IVDA-heroin


NKDA


SH: mother at bedside


ROS: denies chest pain, HA, visual disturbances


FH:NC


PE: pleasant young man in NAD, somnolent


-icterus, adenopathy


no JVD


neck non-tender


Left upper ext swelling, tenderness biceps muscle with mild swelling, remainder 

of left upper arm soft without concern for compartment syndrome


radial pulse +4 ulnar pulse +2


Right upper extremity, multiple tracks from IVDA


diffuse weakness left hand, forearm and upper arm +3/+5, intact sensation


chest clear


CVS  RRR


abd: soft, non-tender, +BS


swelling left flank and hip





CK > 07358


creat 1.7-1.3





Imp: IVDA with likely overdose/found down with rhabdomyolysis


   LUE swelling secondary to rhabdomyolysis without clinical evidence of 

compartment syndrome


   Left shoulder pain


   Diffuse LUE weakness due to neuropraxia likely from compression





Rec: continue medical management of rhabdomyolysis


   MRI left forequarter


   consider HIV, Hep B screening

## 2019-03-06 NOTE — ECHO
[email or web address]





[address] [address] 

Main: [phone] 



Echocardiography Examination 

Transthoracic 



Name:            GILBERT OAKLEY                    MR#:

[id number]

Study Date:      03/05/2019                           Study Time:

08:11 AM

YOB: 1995                           Age:          23

year(s)

Height:          182.9 cm (72 in.)                    Weight:

71.67 kg (158 lb.)

BSA:             1.93 m2                              Gender:

Male

Examination:     Echo                                 Indication:

Cerebral Infarct

Image Quality:                                        Contrast: 

Requested by:    Vilma Haley                         BP:

123 mmHg/77 mmHg

Heart Rate:      94 bpm                               Rhythm: 

Indication:      Cerebral Infarct 



Procedure Staff 

Ultrasound Technician:      Emanuel Treadwell RDCS 

Reading Physician:          Yrn Moulton MD 

Requesting Provider: 

Ordering Physician:         Vilma Haley  

Indication:                 Cerebral Infarct 



Measurements 

Chambers                                           AV/MV 

Label                 Value       Normal Value          Label

Value       Normal Value

IVSd, 2D               0.8 cm     (0.6cm - 1.1cm)       AV Opening, MM

1.8 cm

LVDd, 2D               4.3 cm     (4.2cm - 5.9cm)       MV A Vmax

0.63 m/s

LVDs, 2D               3 cm       (2.1cm - 4cm)         MV E' lateral

0.12 m/s

LVEF visual            60 %                             MV E' mean

0.11 m/s

LVEF, 2D               56 %       (54% - 74%)           MV E' septal

0.1 m/s

LVPWd, 2D              1.1 cm     (0.6cm - 1cm)         MV E Vmax

0.74 m/s

RVDd, 2D               2.2 cm     (1.9cm - 3.8cm)       MV E/A

1.17

TAPSE                  2 cm                             MV E/E'

lateral       5.9

LA Volume, A4C         12 ml      (18ml - 58ml)         MV E/E' mean

6.73

Additional Vessels                                      MV E/E' septal

7.5 (0.6 - 2.6)

Label                 Value       Normal Value     TV/PV 

AoRoot, MM             3 cm       (2.2cm - 3.7cm)       Label

Value       Normal Value



RA Pressure           5 mmHg 

RVSP                  22 mmHg 

TR Pmax               17 mmHg 

TR Vmax               2.08 m/s 

MA End ergalado Galen       1.36 cm/s 

PV PGmax              3 mmHg 

PV Vmax, Caliper      0.87 m/s    (0.6m/s - 0.9m/s) 



Patient: GILBERT OAKLEY                   MRN: [id number]

Study Date: 03/05/2019   Page 1 of 2

08:11 AM 









Conclusions 

Normal left ventricular size and function.  LVEF estimated at 55-60%

without regional wall motion abnormalities.

Normal diastolic function.  Morphologically normal appearing valvular

structures.  Trivial tricuspid regurgitation with a

normal estimated RVSP.  No obvious cardiac source for embolism however

and agitated saline contrast study was not

performed.  If there is a high clinical index of suspicion for cardiac

source for embolism recommend further evaluation

with transesophageal echocardiography and consideration of agitated

saline contrast study.



Findings 



Left Ventricle: 

Left ventricle is normal in size. The EF is visually estimated to be

60 %. Left ventricle wall thickness is normal. There is

no regional wall motion abnormalities. Left ventricular diastolic

function parameters are normal. No left ventricular

hypertrophy.  

Right Ventricle: 

Normal size right ventricle. Right ventricular systolic function is

normal.

Left Atrium: 

The left atrium is normal in size.  

Right Atrium: 

The right atrium is normal in size.  

Mitral Valve: 

No mitral regurgitation. No mitral valve stenosis.  

Aortic Valve: 

No aortic valve regurgitation. There is no aortic stenosis. Aortic

leaflets exhibit no calcification. The aortic valve is

tricuspid.  

Tricuspid Valve: 

Mild tricuspid regurgitation.  

Tricuspid Valve Measurements 

Right Ventricular systolic pressure is measured at 22 mmHg.  

Pulmonic Valve: 

No pulmonic valve regurgitation is evident.  

Aorta: 

The aortic root size in M-mode measures 3.0 cm.  

Aorta Measurements 

AoRoot, MM is 3.0 cm.  

Pericardium: 

No pericardial effusion.  



Exam Details 

Procedure Ordered:          Echo 



Electronically signed by Yrn Moulton MD on 03/06/2019 at 03:17 PM 

(No Signature Object) 



Patient: GILBERT OAKLEY                   MRN: [id number]

Study Date: 03/05/2019   Page 2 of 2

08:11 AM 







[id number]

## 2019-03-06 NOTE — NEUROPROG
Assessment: 


Dory_1995


  -


Neurology Consult:


-


CC: F/U for cerebellar stroke


-


Narrative Summary:


3/5/19: Pt with PMHx of seizure disorder, anxiety, and polysubstance abuse.  Pt 

found down for 11.5 hours and dehydrated on 3/4/19.  He reported he had been 

using heroin, percocet, and xanax recently.  Head CT suggesting of abnormality 

in posterior circulation region.  Brain MRI showed bilateral cerebellar 

infarcts.  Teleneurology recommended full dose aspirin.  He was in renal 

failure so could not get a head/neck CTA.  Pt also complained of left arm 

weakness (possibly from lying on his arm for prolonged period of time).  Pt in 

renal failure with rhabdomyolysis likely from prolonged dehydration.  I 

initially saw the patient on 3/5/19.  Neurologic exam showed left arm weakness.

  I felt he likely had a PRES from acute renal failure that resulted in 

bilateral cerebellar strokes as well as left arm neuropathy from prolonged 

laying on this arm.  Recommend carotid U/S, TTE, telemetry, and PT/OT/SPeech as 

well as addressing renal issues.


-


HPI:


F/U 3/6/19.  Carotid U/S unremarkable and LDL 69/H1AC 5.9.  No new complaints.  

TTE still pending.  Telemetry showed no afib.  Pt continues to slowly improve.


-


PMHx: anxiety, ADD, seizure disorder, polysubstance abuse


-


Home Meds: gabapentin, adderall, ativan, klonopin


-


SHx: no tobacco


FHx: sister committed suicide


-


ROS: Pt denied acute fever, total vision loss, active severe chest pain, 

respiratory failure, total body severe rash, total bowel/bladder incontinence, 

psychosis, active seizures, or active bleeding


-


Labs:


3/4/19- CBC WBC 15.18H, INR 1.23, CK > 30,000


3/5/19- Chem Na 131L BUN 39H Cr 1.7H Gluc 110H Ca 7.7L Ca 1.03L, LDL 69, H1AC 

5.9


-


Rads:


3/4/19- Head CT wo: Diffuse low-density and mild swelling of both cerebellar 

hemispheres. While this could represent artifact, posterior circulation 

ischemia or PRES syndrome could appear similar. Recommend MRI of the brain for 

further evaluation


3/4/19- Cervical MRI wo: Negative MRI examination of the cervical spine


3/4/19- Brain MRI wo con: Multiple bilateral punctate areas of ischemic injury 

involving the cerebellum. No additional areas of ischemia within the cerebral 

hemispheres bilaterally


3/5/19- Carotid U/S: no hemodynamically significant stenosis


-


Assessment:


1. 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 

renal failure may have caused PRES.


-


2. Substance Abuse


3. Acute renal failure with rhabdomyolysis


4. Left arm weakness: Possible from cerebellar stroke or prolonged laying on 

the arm.  PT/OT to assess rehab needs.


-


Plan:


- Recommend ICU stay until 3/7/19 to ensure no swelling in posterior fossa 

causing hydrocephalus given location of cerebellar strokes


- TTE


- Blood pressure goal < 140/90 from neurology perspective unless nephrology 

feels different goals needed, no permissive HTN as it seems like elevated blood 

pressure from PRES could be cause of strokes


- LDL goal < 70 (69)


- H1AC goal < 7.0 (5.9)


- Continue aspirin 325 mg qd for stroke prevention (not on it prior to 

hospitalization), change to aspirin 81 mg qd at hospital discharge for long 

term use


- Avoid illegal drugs


- PT/OT/Speech to determine rehab needs


- F/U in neurology clinic 1-4 weeks after hospital discharge


-


35 min spent with patient and his nurse, majority of time counseling on his 

evaluation and planned future diagnostic course.





Objective: 





 Vital Signs











Temp Pulse Resp BP Pulse Ox


 


 36.8 C   67   19   125/86 H  97 


 


 03/06/19 04:00  03/06/19 08:00  03/06/19 08:00  03/06/19 08:00  03/06/19 08:00








 Laboratory Results





 03/05/19 06:25 





 03/06/19 04:40 





 











 03/05/19 03/06/19 03/07/19





 05:59 05:59 05:59


 


Intake Total 3080 5425 


 


Output Total 1800 3900 800


 


Balance 1280 1525 -800








 











PT  16.1 SEC (12.0-15.0)  H  03/05/19  06:25    


 


INR  1.35  (0.83-1.16)  H  03/05/19  06:25    











Allergies/Adverse Reactions: 


 





No Known Allergies Allergy (Verified 03/05/19 08:08)

## 2019-03-06 NOTE — PDINTPN
Intensivist Progress Note


Assessment/Plan: 


Assessment:





Status post prolonged downtime secondary to drug overdose.





Muscle compression injury with rhabdomyolysis secondary to the above.  Left arm 

and shoulder, back and chest in this area all involved, left gluten somewhat 

left leg.  LUE weakness is associated with this injury.  May have had nerve 

damage secondary to compression as well.  MRI well documents this.  No evidence 

of stroke causing this weakness.





Rhabdomyolysis:  CPK remains greater than 30,000.





M1 hold.  He denies suicide to myself and the nurses.  Reportedly told parents 

he may be suicidal?.





Poly substance abuse, opiate addiction.





History of psychiatric disease, attention deficit hyperactivity disorder, 

anxiety.





Acute renal failure:  Resolving.





Metabolic:  No significant issues.  Sodium 134. 





Punctate cerebellar strokes.  Etiology unclear: PRES, vertebral artery 

dissection, other?











Plan:  Continue present care.  Continue M1 hold and suicide precautions for 

now.  Blood pressure control if needed for systolic greater than 140. Follow 

laboratory.  Follow neurologic examination and right upper extremity movement/

strength.  May need inpatient rehab at the end of his acute stay.











35 min of critical care time spent directly with the patient.  Discussed issues 

with the patient, his parents, hospitalist, nursing, and the ICU multi 

disciplinary team.





Subjective: 





Left arm remains weak, about the same.  Left shoulder and arm pain, left and 

right hip pain remain present


Objective: 





 Vital Signs











Temp Pulse Resp BP Pulse Ox


 


 36.8 C   90   12   122/68 H  95 


 


 03/06/19 04:00  03/06/19 12:00  03/06/19 12:00  03/06/19 12:00  03/06/19 12:00








 Laboratory Results





 03/05/19 06:25 





 03/06/19 04:40 





 











 03/05/19 03/06/19 03/07/19





 05:59 05:59 05:59


 


Intake Total 3080 5425 


 


Output Total 1800 3900 1400


 


Balance 1280 1525 -1400








 











PT  16.1 SEC (12.0-15.0)  H  03/05/19  06:25    


 


INR  1.35  (0.83-1.16)  H  03/05/19  06:25    











 Laboratory Tests











  03/06/19





  04:40


 


Calcium  7.4 L


 


Creatine Kinase  > 07735 H








MRI:  Extensive muscle edema with some mild necrosis related to the right upper 

extremity, shoulder, back, and chest wall consistent with prolonged muscle 

compression from down time/laying on his left side not moving.





Physical Exam





- Physical Exam


General Appearance: alert, no apparent distress


EENT: PERRL/EOMI, other (On room air)


Neck: normal inspection


Respiratory: lungs clear, normal breath sounds, No rales, No rhonchi


Cardiac/Chest: regular rate, rhythm, No gallop


Abdomen: normal bowel sounds, non-tender, soft


Skin: warm/dry


Extremities: swelling (Left upper extremity over the biceps.  Tenderness there, 

somewhat in the forearm, over the shoulder, chest and back, includes, etc.), No 

pedal edema


Neuro/Psych: No no motor/sensory deficits (Left arm weakness persists.  Hard to 

lift at the shoulder.  Hand strength definitely better.  Biceps very weak along 

with triceps.), No cognition abnormalities





ICD10 Worksheet


Patient Problems: 


 Problems











Problem Status Onset


 


Left ankle sprain Acute  


 


Cerebellar infarction Acute  


 


Rhabdomyolysis Acute  


 


Acute renal failure Acute  


 


Shock liver Acute  


 


Brachial plexus neuralgia Acute

## 2019-03-06 NOTE — HOSPPROG
Hospitalist Progress Note


Assessment/Plan: 





24 yo M w rhabdo, LUE weakness, cerebellar infarcts, encephalopathy





?vertebral artery dissection: this scenario is most compatible w vertebral 

artery dissection


   ultrasound demonstrates flow


   ultimately needs CTA if renal function improves vs MRA if it doesnt.. CTA 

better study


   for now, continue full dose aspirin





   renal function improving- will do CTA 3/7





rhabdomyolysis: 2/2 being found down


   continue IVF


   renal injury noted


   follow daily





hyponatremia: repeat now


   will increase IVF rate if stable (currently at 75)


   normalizing


   continue ivf for rhbdo


   





renal: unknown baseline cr


   he mentions h/o "kidney problems"


   renal u/s findings noted


   cr 1.2


   





polysubstance abuse: on m1 hold





hyperkalemia: rhabdo and PRACHI





cerebellar cva: see #1


   full doses asa





dispo: mi, icu





   


Subjective: case d/w dr vieira.  more alert


Objective: 


 Vital Signs











Temp Pulse Resp BP Pulse Ox


 


 36.8 C   90   12   122/68 H  95 


 


 03/06/19 04:00  03/06/19 12:00  03/06/19 12:00  03/06/19 12:00  03/06/19 12:00








 Laboratory Results





 03/05/19 06:25 





 03/06/19 04:40 





 











 03/05/19 03/06/19 03/07/19





 05:59 05:59 05:59


 


Intake Total 3080 5425 


 


Output Total 1800 3900 1400


 


Balance 1280 1525 -1400








 











PT  16.1 SEC (12.0-15.0)  H  03/05/19  06:25    


 


INR  1.35  (0.83-1.16)  H  03/05/19  06:25    














- Physical Exam


Constitutional: no apparent distress, appears nourished


Eyes: PERRL, anicteric sclera


Ears, Nose, Mouth, Throat: moist mucous membranes, hearing normal


Cardiovascular: regular rate and rhythym, no murmur, rub, or gallop


Respiratory: no respiratory distress, no rales or rhonchi


Gastrointestinal: normoactive bowel sounds, soft, non-tender abdomen


Genitourinary: no bladder fullness, No interiano in urethra


Skin: warm, normal color


Musculoskeletal: other (swelling and pain in distal L biceps)


Neurologic: AAOx3, sensation intact bilaterally





ICD10 Worksheet


Patient Problems: 


 Problems











Problem Status Onset


 


Acute renal failure Acute  


 


Brachial plexus neuralgia Acute  


 


Cerebellar infarction Acute  


 


Rhabdomyolysis Acute  


 


Shock liver Acute  


 


Left ankle sprain Acute

## 2019-03-06 NOTE — SOAPPROG
SOAP Progress Note


Assessment/Plan: 


Assessment:


PRACHI due to rhabdo, better with IVF, CK remains elevated


Cerebellar stroke, some concern about possible vertebral artery dissection, OK 

for IV contrast if needed


rhabdomyolysis from muscle compression


hyponatremia, multifactorial, sodium is improving on IVF, continue as is for now


polysubstance abuse, counseled to stop























Plan:


continue IVF


OK for CTA to rule out arterial dissection


needs rehab for his substance abuse


follow CK levels


will continue to follow


03/06/19 08:51





Subjective: 





feels OK no sob nausea or vomiting


having musculoskeletal aches and pains


slept OK


getting up and around in the room, a bit, doesn't feel strong


appetite OK


energy poor


Objective: 





 Vital Signs











Temp Pulse Resp BP Pulse Ox


 


 36.8 C   67   19   125/86 H  97 


 


 03/06/19 04:00  03/06/19 08:00  03/06/19 08:00  03/06/19 08:00  03/06/19 08:00








 Laboratory Results





 03/05/19 06:25 





 03/06/19 04:40 





 











 03/05/19 03/06/19 03/07/19





 05:59 05:59 05:59


 


Intake Total 3080 5425 


 


Output Total 1800 3900 


 


Balance 1280 1525 








 











PT  16.1 SEC (12.0-15.0)  H  03/05/19  06:25    


 


INR  1.35  (0.83-1.16)  H  03/05/19  06:25    














Physical Exam





- Physical Exam


General Appearance: alert


Respiratory: lungs clear, No rhonchi, No wheezing


Cardiac/Chest: regular rate, rhythm, No edema, No gallop, No systolic murmur


Abdomen: normal bowel sounds, non-tender, soft


Extremities: No swelling


Neuro/Psych: alert, oriented x 3





ICD10 Worksheet


Patient Problems: 


 Problems











Problem Status Onset


 


Acute renal failure Acute  


 


Brachial plexus neuralgia Acute  


 


Cerebellar infarction Acute  


 


Rhabdomyolysis Acute  


 


Shock liver Acute  


 


Left ankle sprain Acute

## 2019-03-07 LAB
CK SERPL-CCNC: (no result) IU/L (ref 0–224)
PLATELET # BLD: 135 10^3/UL (ref 150–400)

## 2019-03-07 RX ADMIN — PANTOPRAZOLE SODIUM SCH MG: 40 TABLET, DELAYED RELEASE ORAL at 08:36

## 2019-03-07 RX ADMIN — GABAPENTIN SCH MG: 300 CAPSULE ORAL at 21:27

## 2019-03-07 RX ADMIN — GABAPENTIN SCH MG: 300 CAPSULE ORAL at 17:14

## 2019-03-07 RX ADMIN — OXYCODONE HYDROCHLORIDE AND ACETAMINOPHEN PRN TAB: 5; 325 TABLET ORAL at 01:48

## 2019-03-07 RX ADMIN — GABAPENTIN SCH: 300 CAPSULE ORAL at 15:49

## 2019-03-07 RX ADMIN — OXYCODONE HYDROCHLORIDE AND ACETAMINOPHEN PRN TAB: 5; 325 TABLET ORAL at 19:42

## 2019-03-07 RX ADMIN — ACETAMINOPHEN PRN MG: 325 TABLET ORAL at 13:51

## 2019-03-07 RX ADMIN — DOCUSATE SODIUM AND SENNOSIDES SCH TAB: 50; 8.6 TABLET ORAL at 19:43

## 2019-03-07 RX ADMIN — ONDANSETRON PRN MG: 2 SOLUTION INTRAMUSCULAR; INTRAVENOUS at 18:28

## 2019-03-07 RX ADMIN — ASPIRIN SCH MG: 325 TABLET, FILM COATED ORAL at 08:36

## 2019-03-07 RX ADMIN — NICOTINE SCH MG: 21 PATCH, EXTENDED RELEASE TOPICAL at 08:36

## 2019-03-07 RX ADMIN — SODIUM CHLORIDE SCH MLS: 900 INJECTION, SOLUTION INTRAVENOUS at 16:36

## 2019-03-07 RX ADMIN — GABAPENTIN SCH MG: 300 CAPSULE ORAL at 18:31

## 2019-03-07 RX ADMIN — SODIUM CHLORIDE SCH MLS: 900 INJECTION, SOLUTION INTRAVENOUS at 01:48

## 2019-03-07 RX ADMIN — GABAPENTIN SCH MG: 300 CAPSULE ORAL at 08:37

## 2019-03-07 RX ADMIN — PANTOPRAZOLE SODIUM SCH MG: 40 TABLET, DELAYED RELEASE ORAL at 19:43

## 2019-03-07 NOTE — SOAPPROG
SOAP Progress Note


Assessment/Plan: 


Assessment:


PRACHI due to rhabdo, better with IVF, creat down to 1. today, CK remains elevated

, >30,000


Cerebellar stroke, some concern about possible vertebral artery dissection, OK 

for IV contrast if needed, CTA showed no aneurism or dissection


rhabdomyolysis from muscle compression, continue IVF


hyponatremia, multifactorial, sodium normal today


polysubstance abuse, counseled to stop























Plan:


continue IVF


OK for CTA to rule out arterial dissection


needs rehab for his substance abuse


follow CK levels


Renal signing off, available if needed


03/06/19 08:51





03/07/19 15:09





Subjective: 





not answering questions today


Objective: 





 Vital Signs











Temp Pulse Resp BP Pulse Ox


 


 36.8 C   72   12   114/63   95 


 


 03/07/19 01:00  03/07/19 12:00  03/07/19 12:00  03/07/19 12:00  03/07/19 12:00








 Laboratory Results





 03/07/19 05:30 





 03/07/19 05:30 





 











 03/06/19 03/07/19 03/08/19





 05:59 05:59 05:59


 


Intake Total 5425 2153 


 


Output Total 3900 1400 


 


Balance 1525 753 








 











PT  16.1 SEC (12.0-15.0)  H  03/05/19  06:25    


 


INR  1.35  (0.83-1.16)  H  03/05/19  06:25    














Physical Exam





- Physical Exam


General Appearance: thin


Neck: normal inspection


Respiratory: No rales, No rhonchi, No wheezing


Cardiac/Chest: regular rate, rhythm, No edema, No friction rub


Abdomen: normal bowel sounds, non-tender, soft


Extremities: No swelling


Neuro/Psych: other (won't answer questions today)





ICD10 Worksheet


Patient Problems: 


 Problems











Problem Status Onset


 


Acute renal failure Acute  


 


Brachial plexus neuralgia Acute  


 


Cerebellar infarction Acute  


 


Rhabdomyolysis Acute  


 


Shock liver Acute  


 


Left ankle sprain Acute

## 2019-03-07 NOTE — PDINTPN
Intensivist Progress Note


Assessment/Plan: 


Assessment:





Status post prolonged downtime secondary to drug overdose.





Muscle compression injury with rhabdomyolysis secondary to the above.  Left arm 

and shoulder, back and chest in this area all involved, left gluten somewhat 

left leg.  LUE weakness is associated with this injury.   MRI well documents 

this.  May have had nerve damage secondary to compression as well.  No evidence 

of stroke causing this weakness.





Rhabdomyolysis:  CPK remains greater than 30,000.





M1 hold.  He denies suicide to myself and the nurses.  This was discussed in 

the presence of his mother this morning.  My feeling is that we can medically 

clear him however his CPK remains elevated.  He will thus remain here for 

observation of this laboratory value.  





Poly substance abuse, opiate addiction.





History of psychiatric disease, attention deficit hyperactivity disorder, 

anxiety.





Acute renal failure:  Resolved.





Metabolic:  No significant issues.  Sodium 137. 





Punctate cerebellar strokes.  Etiology unclear:  Possibly PRES.  CTA negative 

for vertebral artery abnormalities.








Plan:  Continue present care.  Continue M1 hold for now.  Blood pressure 

control if needed for systolic greater than 140.  Follow laboratory/CPK.  

Follow neurologic examination and left upper extremity movement/strength.  With 

improvement his now not felt to need rehab.





I will sign off at this point.  Further plans and medical clearance per 

hospitalist service.











30 min of critical care time spent directly with the patient.  Discussed issues 

with the patient, his parents, hospitalist, nursing, and the ICU multi 

disciplinary team.














Subjective: 





Feels better.  Less pain.  Increase movement of the left upper extremity.  

Walking with assistance of a walker.


Objective: 





 Vital Signs











Temp Pulse Resp BP Pulse Ox


 


 36.8 C   71   12   133/89 H  95 


 


 03/07/19 01:00  03/07/19 14:00  03/07/19 14:00  03/07/19 14:00  03/07/19 14:00








 Laboratory Results





 03/07/19 05:30 





 03/07/19 05:30 





 











 03/06/19 03/07/19 03/08/19





 05:59 05:59 05:59


 


Intake Total 5425 2153 


 


Output Total 3900 1400 


 


Balance 1525 753 








 











PT  16.1 SEC (12.0-15.0)  H  03/05/19  06:25    


 


INR  1.35  (0.83-1.16)  H  03/05/19  06:25    








CPK remains greater than 30,000





Physical Exam





- Physical Exam


General Appearance: alert, no apparent distress


EENT: PERRL/EOMI


Neck: normal inspection


Respiratory: lungs clear, normal breath sounds, No respiratory distress


Cardiac/Chest: regular rate, rhythm, No gallop


Abdomen: normal bowel sounds, non-tender, soft


Skin: normal color, warm/dry


Extremities: other (Improving tenderness and swelling over the upper arm, 

shoulder, biceps.)


Neuro/Psych: motor weakness (Right upper extremity weakness persists but is 

significantly better.  Biceps triceps and shoulder strength and movement are 

significantly improved today.), No no motor/sensory deficits, No cognition 

abnormalities





ICD10 Worksheet


Patient Problems: 


 Problems











Problem Status Onset


 


Left ankle sprain Acute  


 


Cerebellar infarction Acute  


 


Rhabdomyolysis Acute  


 


Acute renal failure Acute  


 


Shock liver Acute  


 


Brachial plexus neuralgia Acute

## 2019-03-07 NOTE — ASMTCMCOM
CM Note

 

CM Note                       

Notes:

Discussed with MD, patient is still not medically stable. M1 expires tonight around 2300, M1 will 

be extended.  Will await medical clearance and have TLC evaluate thereafter.  CM will follow.



Plan:  TBD 

 

Date Signed:  03/07/2019 01:42 PM

Electronically Signed By:Pura Pagan RN

## 2019-03-07 NOTE — NEUROPROG
Assessment: 


[id number]


  -


Neurology Consult:


-


CC: F/U for cerebellar stroke


-


Narrative Summary:


3/5/19: Pt with PMHx of seizure disorder, anxiety, and polysubstance abuse.  Pt 

found down for 11.5 hours and dehydrated on 3/4/19.  He reported he had been 

using heroin, percocet, and xanax recently.  Head CT suggesting of abnormality 

in posterior circulation region.  Brain MRI showed bilateral cerebellar 

infarcts.  Teleneurology recommended full dose aspirin.  He was in renal 

failure so could not get a head/neck CTA.  Pt also complained of left arm 

weakness (possibly from lying on his arm for prolonged period of time).  Pt in 

renal failure with rhabdomyolysis likely from prolonged dehydration.  I 

initially saw the patient on 3/5/19.  Neurologic exam showed left arm weakness.

  I felt he likely had a PRES from acute renal failure that resulted in 

bilateral cerebellar strokes as well as left arm neuropathy from prolonged 

laying on this arm.  Recommend carotid U/S, TTE, telemetry, and PT/OT/SPeech as 

well as addressing renal issues.


-


F/U 3/6/19.  Carotid U/S unremarkable and LDL 69/H1AC 5.9.  No new complaints.  

TTE still pending.  Telemetry showed no afib.  Pt continues to slowly improve.


-


HPI:


F/U 3/7/19.  TTE showed no cause of stroke.  Left arm MRI shows edema from 

likely prolonged dependent positioning and explains his left arm weakness.  

Regarding left arm weakness treatment would be PT/OT and orthopedic evaluation.

  Regarding stroke it is likely caused from abnormal renal function 

predisposing to posterior circulation stroke.  Treatment is addressing 

underlying renal abnl and blood pressure.  Pt should also remain on lifelong 

aspirin to lower stroke risk.  No additional inpt neurology w/u needed, 

neurology will sign off.


-


PMHx: anxiety, ADD, seizure disorder, polysubstance abuse


-


Home Meds: gabapentin, adderall, ativan, klonopin


-


SHx: no tobacco


FHx: sister committed suicide


-


ROS: Pt denied acute fever, total vision loss, active severe chest pain, 

respiratory failure, total body severe rash, total bowel/bladder incontinence, 

psychosis, active seizures, or active bleeding


-


Labs:


3/4/19- CBC WBC 15.18H, INR 1.23, CK > 30,000


3/5/19- Chem Na 131L BUN 39H Cr 1.7H Gluc 110H Ca 7.7L Ca 1.03L, LDL 69, H1AC 

5.9


-


Rads:


3/4/19- Head CT wo: Diffuse low-density and mild swelling of both cerebellar 

hemispheres. While this could represent artifact, posterior circulation 

ischemia or PRES syndrome could appear similar. Recommend MRI of the brain for 

further evaluation


3/4/19- Cervical MRI wo: Negative MRI examination of the cervical spine


3/4/19- Brain MRI wo con: Multiple bilateral punctate areas of ischemic injury 

involving the cerebellum. No additional areas of ischemia within the cerebral 

hemispheres bilaterally


3/5/19- Carotid U/S: no hemodynamically significant stenosis


3/5/19- TTE: EF 60%, no cardiac thrombus noted


3/6/19- Left arm MRI wo: Extensive abnormal intramuscular edema involving the 

distal left biceps muscle, proximal left triceps muscle, left teres major muscle

, musculature of the left thoracic inlet including scalenes, and posterior 

chest wall musculature including the rhomboid major and right trapezius muscle, 

compatible with rhabdomyolysis/myonecrosis. Additional abnormal intramuscular 

edema is present within the intercostal muscles of the upper left chest. Mild 

serosal edema and fluid over the posterior surface of the teres major muscle.


-


Assessment:


1. 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 

renal failure may have caused PRES leading to posterior circulation stroke.


-


2. Substance Abuse


-


3. Acute renal failure with rhabdomyolysis


-


4. Left arm weakness from prolonged lying on arm and rhabdo: treatment would be 

PT/OT and orthopedic evaluation


-


Plan:


- Recommend orthopedic evaluation of left arm


- Blood pressure goal < 140/90 from neurology perspective unless nephrology 

feels different goals needed, no permissive HTN as it seems like elevated blood 

pressure from PRES could be cause of strokes


- LDL goal < 70 (69)


- H1AC goal < 7.0 (5.9)


- Continue aspirin 325 mg qd for stroke prevention (not on it prior to 

hospitalization), change to aspirin 81 mg qd at hospital discharge for long 

term use


- Avoid illegal drugs


- PT/OT/Speech to determine rehab needs


- F/U in neurology clinic 1-4 weeks after hospital discharge


- No further inpatient neurology w/u needed, neurology will sign off


-


35 min spent with patient, majority of time spent counseling on treatment, 

prognosis, and diagnostic plans





Objective: 





 Vital Signs











Temp Pulse Resp BP Pulse Ox


 


 36.8 C   72   17   116/76   95 


 


 03/07/19 01:00  03/07/19 06:00  03/07/19 04:00  03/07/19 04:00  03/07/19 06:00








 Laboratory Results





 03/07/19 05:30 





 03/07/19 05:30 





 











 03/06/19 03/07/19 03/08/19





 05:59 05:59 05:59


 


Intake Total 5425 2153 


 


Output Total 3900 1400 


 


Balance 1525 753 








 











PT  16.1 SEC (12.0-15.0)  H  03/05/19  06:25    


 


INR  1.35  (0.83-1.16)  H  03/05/19  06:25    











Allergies/Adverse Reactions: 


 





No Known Allergies Allergy (Verified 03/05/19 08:08)

## 2019-03-07 NOTE — HOSPPROG
Hospitalist Progress Note


Assessment/Plan: 





22 yo M w rhabdo, LUE weakness, cerebellar infarcts, encephalopathy





?vertebral artery dissection: no





rhabdomyolysis: 2/2 being found down


   continue IVF


   renal injury noted, now improved


   ck 51K


   follow daily





hyponatremia: repeat now


   will increase IVF rate if stable (currently at 75)


   normalizing


   continue ivf for rhbdo


   





renal: unknown baseline cr


   he mentions h/o "kidney problems"


   renal u/s findings noted


   cr 1.2


   





polysubstance abuse: on m1 hold





hyperkalemia: rhabdo and PRACHI





cerebellar cva: possibly PRES


   full doses asa





dispo: m1, icu





   


Subjective: CK 51K.  CTA neg.  case d/w dr vieira


Objective: 


 Vital Signs











Temp Pulse Resp BP Pulse Ox


 


 36.8 C   72   12   114/63   95 


 


 03/07/19 01:00  03/07/19 12:00  03/07/19 12:00  03/07/19 12:00  03/07/19 12:00








 Laboratory Results





 03/07/19 05:30 





 03/07/19 05:30 





 











 03/06/19 03/07/19 03/08/19





 05:59 05:59 05:59


 


Intake Total 5425 2153 


 


Output Total 3900 1400 


 


Balance 1525 753 








 











PT  16.1 SEC (12.0-15.0)  H  03/05/19  06:25    


 


INR  1.35  (0.83-1.16)  H  03/05/19  06:25    














- Physical Exam


Constitutional: no apparent distress, appears nourished


Eyes: PERRL, anicteric sclera


Ears, Nose, Mouth, Throat: moist mucous membranes, hearing normal


Cardiovascular: regular rate and rhythym, no murmur, rub, or gallop


Respiratory: no respiratory distress, no rales or rhonchi


Gastrointestinal: normoactive bowel sounds, soft, non-tender abdomen


Genitourinary: no bladder fullness, No interiano in urethra


Skin: warm, normal color


Musculoskeletal: full muscle strength, other (distal L biceps soft)


Neurologic: AAOx3





ICD10 Worksheet


Patient Problems: 


 Problems











Problem Status Onset


 


Acute renal failure Acute  


 


Brachial plexus neuralgia Acute  


 


Cerebellar infarction Acute  


 


Rhabdomyolysis Acute  


 


Shock liver Acute  


 


Left ankle sprain Acute

## 2019-03-08 LAB — CK SERPL-CCNC: (no result) IU/L (ref 0–224)

## 2019-03-08 RX ADMIN — OXYCODONE HYDROCHLORIDE AND ACETAMINOPHEN PRN TAB: 5; 325 TABLET ORAL at 16:31

## 2019-03-08 RX ADMIN — OXYCODONE HYDROCHLORIDE AND ACETAMINOPHEN PRN TAB: 5; 325 TABLET ORAL at 12:08

## 2019-03-08 RX ADMIN — SODIUM CHLORIDE SCH MLS: 900 INJECTION, SOLUTION INTRAVENOUS at 20:38

## 2019-03-08 RX ADMIN — OXYCODONE HYDROCHLORIDE AND ACETAMINOPHEN PRN TAB: 5; 325 TABLET ORAL at 06:34

## 2019-03-08 RX ADMIN — ASPIRIN SCH MG: 325 TABLET, FILM COATED ORAL at 08:03

## 2019-03-08 RX ADMIN — DOCUSATE SODIUM AND SENNOSIDES SCH TAB: 50; 8.6 TABLET ORAL at 08:03

## 2019-03-08 RX ADMIN — SUCRALFATE SCH GM: 1 SUSPENSION ORAL at 16:31

## 2019-03-08 RX ADMIN — DOCUSATE SODIUM AND SENNOSIDES SCH TAB: 50; 8.6 TABLET ORAL at 20:35

## 2019-03-08 RX ADMIN — NICOTINE SCH MG: 21 PATCH, EXTENDED RELEASE TOPICAL at 07:39

## 2019-03-08 RX ADMIN — PANTOPRAZOLE SODIUM SCH MG: 40 TABLET, DELAYED RELEASE ORAL at 20:35

## 2019-03-08 RX ADMIN — SUCRALFATE SCH GM: 1 SUSPENSION ORAL at 20:35

## 2019-03-08 RX ADMIN — SUCRALFATE SCH: 1 SUSPENSION ORAL at 15:44

## 2019-03-08 RX ADMIN — GABAPENTIN SCH MG: 300 CAPSULE ORAL at 15:25

## 2019-03-08 RX ADMIN — SODIUM CHLORIDE SCH MLS: 900 INJECTION, SOLUTION INTRAVENOUS at 08:11

## 2019-03-08 RX ADMIN — GABAPENTIN SCH MG: 300 CAPSULE ORAL at 08:03

## 2019-03-08 RX ADMIN — GABAPENTIN SCH MG: 300 CAPSULE ORAL at 20:35

## 2019-03-08 RX ADMIN — PANTOPRAZOLE SODIUM SCH MG: 40 TABLET, DELAYED RELEASE ORAL at 08:04

## 2019-03-08 RX ADMIN — OXYCODONE HYDROCHLORIDE AND ACETAMINOPHEN PRN TAB: 5; 325 TABLET ORAL at 20:42

## 2019-03-08 NOTE — ASMTCMCOM
CM Note

 

CM Note                       

Notes:

Note from NESS RN:



If patient ends up needing inpatient behavioral health, will need to page [facility] at #[phone] 

and they will give us auth and placement options.  [facility] will evaluate and follow this process when 

med cleared.  (See previous CM note for further details).  CM will follow.

 

Date Signed:  03/08/2019 05:00 PM

Electronically Signed By:Pura Pagan RN

## 2019-03-08 NOTE — HOSPPROG
Hospitalist Progress Note


Assessment/Plan: 





24 yo M w rhabdo, LUE weakness, cerebellar infarcts, encephalopathy





?vertebral artery dissection: no





rhabdomyolysis: 2/2 being found down


   continue IVF


   renal injury noted, now improved


   ck 51K


   follow daily, await today's diluted specimen





hyponatremia: repeat now


   will increase IVF rate if stable (currently at 75)


   normalized


   continue ivf for rahbdo


   





renal: unknown baseline cr


   he mentions h/o "kidney problems"


   renal u/s findings noted


   cr 1.2


   





polysubstance abuse: on m1 hold





hyperkalemia: rhabdo and PRACHI





cerebellar cva: possibly PRES


   full doses asa





dispo: m1, icu





   


Subjective: ck remains > 30K.  case d/w dr soliz


Objective: 


 Vital Signs











Temp Pulse Resp BP Pulse Ox


 


 36.6 C   79   18   127/75 H  94 


 


 03/08/19 08:00  03/08/19 08:00  03/08/19 08:00  03/08/19 08:00  03/08/19 08:00








 Laboratory Results





 03/07/19 05:30 





 03/08/19 05:40 





 











 03/07/19 03/08/19 03/09/19





 05:59 05:59 05:59


 


Intake Total 2153 2401 790


 


Output Total 1400  380


 


Balance 753 2401 410








 











PT  16.1 SEC (12.0-15.0)  H  03/05/19  06:25    


 


INR  1.35  (0.83-1.16)  H  03/05/19  06:25    














- Physical Exam


Constitutional: no apparent distress, appears nourished


Eyes: PERRL, anicteric sclera


Ears, Nose, Mouth, Throat: moist mucous membranes, hearing normal


Cardiovascular: regular rate and rhythym, no murmur, rub, or gallop


Respiratory: no respiratory distress, no rales or rhonchi


Gastrointestinal: normoactive bowel sounds, soft, non-tender abdomen


Genitourinary: no bladder fullness, No interiano in urethra


Skin: warm, normal color


Musculoskeletal: full muscle strength


Neurologic: AAOx3





ICD10 Worksheet


Patient Problems: 


 Problems











Problem Status Onset


 


Acute renal failure Acute  


 


Brachial plexus neuralgia Acute  


 


Cerebellar infarction Acute  


 


Rhabdomyolysis Acute  


 


Shock liver Acute  


 


Left ankle sprain Acute

## 2019-03-08 NOTE — ASMTCMCOM
CM Note

 

CM Note                       

Notes:

Patient remains on M1 Hold, awaiting medical clearance.  Parents at bedside and supportive. They 

are concerned about next steps for their son.  Once patient has been medically cleared, will have 

[facility] evaluate.  Therapy involved, patient up and walking in room independently.  CM signed release 

d/t hospitalization for patient's  per mother's request. 



Plan:  TBD, awaiting [facility] recs. 

 

Date Signed:  03/08/2019 12:05 PM

Electronically Signed By:Pura Pagan RN

## 2019-03-09 VITALS — SYSTOLIC BLOOD PRESSURE: 123 MMHG | DIASTOLIC BLOOD PRESSURE: 77 MMHG

## 2019-03-09 LAB — CK SERPL-CCNC: (no result) IU/L (ref 0–224)

## 2019-03-09 RX ADMIN — ASPIRIN SCH MG: 325 TABLET, FILM COATED ORAL at 09:46

## 2019-03-09 RX ADMIN — OXYCODONE HYDROCHLORIDE AND ACETAMINOPHEN PRN TAB: 5; 325 TABLET ORAL at 01:49

## 2019-03-09 RX ADMIN — PANTOPRAZOLE SODIUM SCH MG: 40 TABLET, DELAYED RELEASE ORAL at 09:45

## 2019-03-09 RX ADMIN — OXYCODONE HYDROCHLORIDE AND ACETAMINOPHEN PRN TAB: 5; 325 TABLET ORAL at 09:50

## 2019-03-09 RX ADMIN — SUCRALFATE SCH GM: 1 SUSPENSION ORAL at 07:56

## 2019-03-09 RX ADMIN — OXYCODONE HYDROCHLORIDE AND ACETAMINOPHEN PRN TAB: 5; 325 TABLET ORAL at 10:55

## 2019-03-09 RX ADMIN — NICOTINE SCH MG: 21 PATCH, EXTENDED RELEASE TOPICAL at 09:45

## 2019-03-09 RX ADMIN — GABAPENTIN SCH MG: 300 CAPSULE ORAL at 09:45

## 2019-03-09 RX ADMIN — OXYCODONE HYDROCHLORIDE AND ACETAMINOPHEN PRN TAB: 5; 325 TABLET ORAL at 14:54

## 2019-03-09 RX ADMIN — DOCUSATE SODIUM AND SENNOSIDES SCH TAB: 50; 8.6 TABLET ORAL at 09:45

## 2019-03-09 RX ADMIN — ONDANSETRON PRN MG: 2 SOLUTION INTRAMUSCULAR; INTRAVENOUS at 09:33

## 2019-03-09 RX ADMIN — GABAPENTIN SCH MG: 300 CAPSULE ORAL at 15:58

## 2019-03-09 RX ADMIN — SUCRALFATE SCH GM: 1 SUSPENSION ORAL at 11:06
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
## 2019-03-09 NOTE — HOSPPROG
Hospitalist Progress Note


Assessment/Plan: 





22 yo M w rhabdo, LUE weakness, cerebellar infarcts, encephalopathy





?vertebral artery dissection: no





rhabdomyolysis: 2/2 being found down


   continue IVF


   renal injury noted, now improved


   ck 51K


   follow daily, await today's diluted specimen





hyponatremia: repeat now


   will increase IVF rate if stable (currently at 75)


   normalized


   continue ivf for rahbdo


   





renal: unknown baseline cr


   he mentions h/o "kidney problems"


   renal u/s findings noted


   cr 1.2


   





polysubstance abuse: on m1 hold





hyperkalemia: rhabdo and PRACHI





cerebellar cva: possibly PRES


   full doses asa





dispo: medically cleared


   > 30 minutes


   


Subjective: CK 15K.  cr OK


Objective: 


 Vital Signs











Temp Pulse Resp BP Pulse Ox


 


 36.7 C   64   16   123/77 H  96 


 


 03/09/19 08:00  03/09/19 08:00  03/09/19 08:00  03/09/19 08:00  03/09/19 08:00








 Laboratory Results





 03/07/19 05:30 





 03/09/19 05:50 





 











 03/08/19 03/09/19 03/10/19





 05:59 05:59 06:59


 


Intake Total 2401 5739 


 


Output Total  380 


 


Balance 2401 5359 








 











PT  16.1 SEC (12.0-15.0)  H  03/05/19  06:25    


 


INR  1.35  (0.83-1.16)  H  03/05/19  06:25    














- Physical Exam


Constitutional: no apparent distress, appears nourished


Eyes: PERRL, anicteric sclera


Ears, Nose, Mouth, Throat: moist mucous membranes, hearing normal


Cardiovascular: regular rate and rhythym, no murmur, rub, or gallop


Respiratory: no respiratory distress, no rales or rhonchi


Gastrointestinal: normoactive bowel sounds, soft, non-tender abdomen


Genitourinary: No interiano in urethra


Skin: warm


Musculoskeletal: full muscle strength, other (biceps soft, buttock soft)


Neurologic: AAOx3, sensation intact bilaterally


Psychiatric: interacting appropriately





ICD10 Worksheet


Patient Problems: 


 Problems











Problem Status Onset


 


Acute renal failure Acute  


 


Brachial plexus neuralgia Acute  


 


Cerebellar infarction Acute  


 


Rhabdomyolysis Acute  


 


Shock liver Acute  


 


Left ankle sprain Acute

## 2019-03-09 NOTE — PDIAF
- Diagnosis


Diagnosis: rhabdomyolysis


Code Status: Full Code





- Medication Management


Discharge Medications: electronically signed and located in the Home Medication 

List.





- Orders


Services needed: Registered Nurse, Certified Nursing Aide, Master 


Additional Instructions: 


1. Please refrain from abusing drugs.  


2. Return to the emergency department immediately for fever, vomiting, confusion

, headache, abdominal pain or other worsening of condition.  


3. Followup with your primary care physician within 72 hours for reevaluation.








- Follow Up Care


Current Providers and Referrals: 


[facility],. [Clinic] - As per Instructions

## 2019-03-09 NOTE — GDS
[f 
rep st]



                                                             DISCHARGE SUMMARY





DISCHARGE DIAGNOSES:  

1.  Rhabdomyolysis. 

2.  Weakness of his arm secondary to pain and muscle compression. 

3.  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 
for vertebral artery dissection, possible press. 

4.  Anxiety. 

5.  Depression. 

6.  Polysubstance abuse.  He was found unresponsive by roommates at home, was 
brought into the hospital.  At that time, he was did not require intubation.  
Tox screen positive for opiates and amphetamines and marijuana, but negative 
for benzos.  He had a leukocytosis.  He had a CK greater than 30,000 with a 
presenting creatinine of 2.1.  The patient was hydrated.  CK took a long time 
to fall, but is 15,000 today, consistent with resolved rhabdomyolysis.  The 
patient does not have kidney injury.  His creatinine is less than 1. 

7.  Patient is maintained on an M1 hold.  He is now transferred to [facility] for ongoing care.  Notably, the patient has a history of suicide in his 
family in the last year with his sister.





Job #:  [id number]

MTDD

## 2019-03-09 NOTE — ASMTTCLDSP
TLC Discharge Disposition

 

Disposition:                  Answers:  Transfer                              

Disposition Notes:            

Notes:

In consultation with [facility] Hospitalist, Dr. Israel MD and on-call psychiatrist [place] 

Psychiatrist, Dr. Whittaker, both concurred that pt appears to meet 27-65 criteria requiring 

psychiatric hospitalization as pt appears to be at risk of harm to self due to a mental illness 

condition. Pt was read the Patient Rights and Responsibilities Statement on 3/9/19 at 11:45am 

original placed on chart, and was given photocopy of Rights. Pt signed the Patient Rights. 

Discharge                     

Concerns/Recommendations:     

Notes:

Pt was accepted at [facility] facility, [facility].

Was patient given the         Answers:  Not applicable                        

Inpatient Behavioral                                                          

Health Prohibited                                                             

Belongings List while in                                                      

the ED?                                                                       

Type of Hold:                 Answers:  M1/72-hour Hold                       

Hold initiated by:            Answers:  Other                         Notes:  TLC/Hospitalist

For Transfers, Accepting      [facility]

Facility:                     

For Transfers, Accepting      Dr Souleymane Carrillo

Psychiatrist:                 

For Transfers, Reason         [facility]

Patient is Being              

Transferred:                  

 

Date Signed:  03/09/2019 02:35 PM

Electronically Signed By:Dorene Chou